# Patient Record
Sex: FEMALE | Race: WHITE | NOT HISPANIC OR LATINO | Employment: FULL TIME | URBAN - METROPOLITAN AREA
[De-identification: names, ages, dates, MRNs, and addresses within clinical notes are randomized per-mention and may not be internally consistent; named-entity substitution may affect disease eponyms.]

---

## 2017-03-15 ENCOUNTER — HOSPITAL ENCOUNTER (OUTPATIENT)
Dept: RADIOLOGY | Facility: MEDICAL CENTER | Age: 52
End: 2017-03-15

## 2017-03-23 ENCOUNTER — HOSPITAL ENCOUNTER (OUTPATIENT)
Dept: RADIOLOGY | Facility: MEDICAL CENTER | Age: 52
End: 2017-03-23
Attending: OBSTETRICS & GYNECOLOGY
Payer: COMMERCIAL

## 2017-03-23 DIAGNOSIS — Z12.31 SCREENING MAMMOGRAM, ENCOUNTER FOR: ICD-10-CM

## 2017-03-23 PROCEDURE — 77063 BREAST TOMOSYNTHESIS BI: CPT

## 2017-07-15 ENCOUNTER — HOSPITAL ENCOUNTER (OUTPATIENT)
Dept: LAB | Facility: MEDICAL CENTER | Age: 52
End: 2017-07-15
Attending: FAMILY MEDICINE
Payer: COMMERCIAL

## 2017-07-15 LAB
25(OH)D3 SERPL-MCNC: 21 NG/ML (ref 30–100)
ALBUMIN SERPL BCP-MCNC: 4.4 G/DL (ref 3.2–4.9)
ALBUMIN/GLOB SERPL: 2.1 G/DL
ALP SERPL-CCNC: 57 U/L (ref 30–99)
ALT SERPL-CCNC: 22 U/L (ref 2–50)
ANION GAP SERPL CALC-SCNC: 5 MMOL/L (ref 0–11.9)
AST SERPL-CCNC: 18 U/L (ref 12–45)
BASOPHILS # BLD AUTO: 0.8 % (ref 0–1.8)
BASOPHILS # BLD: 0.04 K/UL (ref 0–0.12)
BILIRUB SERPL-MCNC: 0.6 MG/DL (ref 0.1–1.5)
BUN SERPL-MCNC: 13 MG/DL (ref 8–22)
CALCIUM SERPL-MCNC: 9.8 MG/DL (ref 8.5–10.5)
CHLORIDE SERPL-SCNC: 106 MMOL/L (ref 96–112)
CHOLEST SERPL-MCNC: 194 MG/DL (ref 100–199)
CO2 SERPL-SCNC: 29 MMOL/L (ref 20–33)
CREAT SERPL-MCNC: 0.67 MG/DL (ref 0.5–1.4)
EOSINOPHIL # BLD AUTO: 0.33 K/UL (ref 0–0.51)
EOSINOPHIL NFR BLD: 6.3 % (ref 0–6.9)
ERYTHROCYTE [DISTWIDTH] IN BLOOD BY AUTOMATED COUNT: 40.6 FL (ref 35.9–50)
GFR SERPL CREATININE-BSD FRML MDRD: >60 ML/MIN/1.73 M 2
GLOBULIN SER CALC-MCNC: 2.1 G/DL (ref 1.9–3.5)
GLUCOSE SERPL-MCNC: 97 MG/DL (ref 65–99)
HCT VFR BLD AUTO: 45.9 % (ref 37–47)
HDLC SERPL-MCNC: 64 MG/DL
HGB BLD-MCNC: 15.2 G/DL (ref 12–16)
IMM GRANULOCYTES # BLD AUTO: 0.01 K/UL (ref 0–0.11)
IMM GRANULOCYTES NFR BLD AUTO: 0.2 % (ref 0–0.9)
LDLC SERPL CALC-MCNC: 110 MG/DL
LYMPHOCYTES # BLD AUTO: 1.86 K/UL (ref 1–4.8)
LYMPHOCYTES NFR BLD: 35.2 % (ref 22–41)
MCH RBC QN AUTO: 29.5 PG (ref 27–33)
MCHC RBC AUTO-ENTMCNC: 33.1 G/DL (ref 33.6–35)
MCV RBC AUTO: 89 FL (ref 81.4–97.8)
MONOCYTES # BLD AUTO: 0.31 K/UL (ref 0–0.85)
MONOCYTES NFR BLD AUTO: 5.9 % (ref 0–13.4)
NEUTROPHILS # BLD AUTO: 2.73 K/UL (ref 2–7.15)
NEUTROPHILS NFR BLD: 51.6 % (ref 44–72)
NRBC # BLD AUTO: 0 K/UL
NRBC BLD AUTO-RTO: 0 /100 WBC
PLATELET # BLD AUTO: 227 K/UL (ref 164–446)
PMV BLD AUTO: 11.9 FL (ref 9–12.9)
POTASSIUM SERPL-SCNC: 4.1 MMOL/L (ref 3.6–5.5)
PROT SERPL-MCNC: 6.5 G/DL (ref 6–8.2)
RBC # BLD AUTO: 5.16 M/UL (ref 4.2–5.4)
SODIUM SERPL-SCNC: 140 MMOL/L (ref 135–145)
T3 SERPL-MCNC: 86.2 NG/DL (ref 60–181)
T4 FREE SERPL-MCNC: 0.82 NG/DL (ref 0.53–1.43)
TRIGL SERPL-MCNC: 102 MG/DL (ref 0–149)
TSH SERPL DL<=0.005 MIU/L-ACNC: 5.2 UIU/ML (ref 0.3–3.7)
WBC # BLD AUTO: 5.3 K/UL (ref 4.8–10.8)

## 2017-07-15 PROCEDURE — 84480 ASSAY TRIIODOTHYRONINE (T3): CPT

## 2017-07-15 PROCEDURE — 84439 ASSAY OF FREE THYROXINE: CPT

## 2017-07-15 PROCEDURE — 80053 COMPREHEN METABOLIC PANEL: CPT

## 2017-07-15 PROCEDURE — 85025 COMPLETE CBC W/AUTO DIFF WBC: CPT

## 2017-07-15 PROCEDURE — 80061 LIPID PANEL: CPT

## 2017-07-15 PROCEDURE — 36415 COLL VENOUS BLD VENIPUNCTURE: CPT

## 2017-07-15 PROCEDURE — 84443 ASSAY THYROID STIM HORMONE: CPT

## 2017-07-15 PROCEDURE — 82306 VITAMIN D 25 HYDROXY: CPT

## 2017-12-14 ENCOUNTER — HOSPITAL ENCOUNTER (OUTPATIENT)
Dept: LAB | Facility: MEDICAL CENTER | Age: 52
End: 2017-12-14
Attending: NURSE PRACTITIONER
Payer: COMMERCIAL

## 2017-12-14 LAB
25(OH)D3 SERPL-MCNC: 27 NG/ML (ref 30–100)
T4 FREE SERPL-MCNC: 1.25 NG/DL (ref 0.53–1.43)
TSH SERPL DL<=0.005 MIU/L-ACNC: 0.09 UIU/ML (ref 0.38–5.33)

## 2017-12-14 PROCEDURE — 84439 ASSAY OF FREE THYROXINE: CPT

## 2017-12-14 PROCEDURE — 36415 COLL VENOUS BLD VENIPUNCTURE: CPT

## 2017-12-14 PROCEDURE — 82306 VITAMIN D 25 HYDROXY: CPT

## 2017-12-14 PROCEDURE — 84443 ASSAY THYROID STIM HORMONE: CPT

## 2018-03-15 ENCOUNTER — OFFICE VISIT (OUTPATIENT)
Dept: URGENT CARE | Facility: CLINIC | Age: 53
End: 2018-03-15
Payer: COMMERCIAL

## 2018-03-15 ENCOUNTER — APPOINTMENT (OUTPATIENT)
Dept: RADIOLOGY | Facility: IMAGING CENTER | Age: 53
End: 2018-03-15
Attending: EMERGENCY MEDICINE
Payer: COMMERCIAL

## 2018-03-15 VITALS
HEIGHT: 66 IN | TEMPERATURE: 97.5 F | OXYGEN SATURATION: 98 % | BODY MASS INDEX: 26.03 KG/M2 | DIASTOLIC BLOOD PRESSURE: 80 MMHG | SYSTOLIC BLOOD PRESSURE: 120 MMHG | RESPIRATION RATE: 18 BRPM | HEART RATE: 82 BPM | WEIGHT: 162 LBS

## 2018-03-15 DIAGNOSIS — S19.9XXA NECK INJURY, INITIAL ENCOUNTER: ICD-10-CM

## 2018-03-15 DIAGNOSIS — S39.012A STRAIN, BACK, INITIAL ENCOUNTER: ICD-10-CM

## 2018-03-15 DIAGNOSIS — S13.4XXA WHIPLASH INJURY TO NECK, INITIAL ENCOUNTER: ICD-10-CM

## 2018-03-15 DIAGNOSIS — S86.912A KNEE STRAIN, LEFT, INITIAL ENCOUNTER: ICD-10-CM
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
## 2018-03-15 PROCEDURE — 72040 X-RAY EXAM NECK SPINE 2-3 VW: CPT | Mod: TC,FY | Performed by: EMERGENCY MEDICINE

## 2018-03-15 PROCEDURE — 99203 OFFICE O/P NEW LOW 30 MIN: CPT | Performed by: EMERGENCY MEDICINE

## 2018-03-15 RX ORDER — LEVOTHYROXINE SODIUM 0.12 MG/1
112 TABLET ORAL
COMMUNITY
End: 2021-09-21

## 2018-03-15 RX ORDER — CYCLOBENZAPRINE HCL 5 MG
5-10 TABLET ORAL 3 TIMES DAILY PRN
Qty: 20 TAB | Refills: 0 | Status: SHIPPED | OUTPATIENT
Start: 2018-03-15 | End: 2021-09-21

## 2018-03-15 RX ORDER — CELECOXIB 200 MG/1
200 CAPSULE ORAL 2 TIMES DAILY PRN
Qty: 14 CAP | Refills: 0 | Status: CANCELLED | OUTPATIENT
Start: 2018-03-15

## 2018-03-15 RX ORDER — CELECOXIB 200 MG/1
200 CAPSULE ORAL 2 TIMES DAILY PRN
Qty: 14 CAP | Refills: 0 | Status: SHIPPED | OUTPATIENT
Start: 2018-03-15 | End: 2021-09-21

## 2018-03-15 RX ORDER — CYCLOBENZAPRINE HCL 5 MG
5-10 TABLET ORAL 3 TIMES DAILY PRN
Qty: 20 TAB | Refills: 0 | Status: CANCELLED | OUTPATIENT
Start: 2018-03-15

## 2018-03-15 ASSESSMENT — ENCOUNTER SYMPTOMS
VOMITING: 0
ABDOMINAL PAIN: 0
NAUSEA: 0
HEADACHES: 1
FEVER: 0
NECK PAIN: 1
LOSS OF CONSCIOUSNESS: 0
JOINT SWELLING: 0
BACK PAIN: 1
DIZZINESS: 0
FOCAL WEAKNESS: 0
SENSORY CHANGE: 0
TINGLING: 0
BLURRED VISION: 0

## 2018-03-15 ASSESSMENT — PATIENT HEALTH QUESTIONNAIRE - PHQ9: CLINICAL INTERPRETATION OF PHQ2 SCORE: 0
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
## 2018-03-16 ENCOUNTER — TELEPHONE (OUTPATIENT)
Dept: URGENT CARE | Facility: PHYSICIAN GROUP | Age: 53
End: 2018-03-16

## 2018-03-16 ENCOUNTER — HOSPITAL ENCOUNTER (OUTPATIENT)
Dept: RADIOLOGY | Facility: MEDICAL CENTER | Age: 53
End: 2018-03-16
Attending: EMERGENCY MEDICINE
Payer: COMMERCIAL

## 2018-03-16 DIAGNOSIS — S13.4XXA WHIPLASH INJURY TO NECK, INITIAL ENCOUNTER: ICD-10-CM

## 2018-03-16 PROCEDURE — 72141 MRI NECK SPINE W/O DYE: CPT

## 2018-03-16 NOTE — TELEPHONE ENCOUNTER
Please notify patient of MRI test of neck negative.  Per radiologist:  1.  Mild degenerative disease in the cervical spine as described above.  2.  There is no acute posttraumatic sequelae.  3.  There is no evidence of ligamentous injury.  No change in treatment plan.

## 2018-03-17 ENCOUNTER — TELEPHONE (OUTPATIENT)
Dept: URGENT CARE | Facility: PHYSICIAN GROUP | Age: 53
End: 2018-03-17

## 2018-03-17 DIAGNOSIS — S13.4XXA WHIPLASH INJURY TO NECK, INITIAL ENCOUNTER: ICD-10-CM

## 2018-03-17 RX ORDER — METHOCARBAMOL 500 MG/1
1000 TABLET, FILM COATED ORAL 4 TIMES DAILY PRN
Qty: 30 TAB | Refills: 0 | Status: SHIPPED | OUTPATIENT
Start: 2018-03-17 | End: 2021-09-21

## 2018-03-17 NOTE — TELEPHONE ENCOUNTER
Called pt regarding Rx , She have one more question regarding her last visit . She need to know why you did'n order a X-ray for her lower back . ????? Please could you call her back . thanks

## 2018-03-17 NOTE — TELEPHONE ENCOUNTER
Please notify patient of Rx for new muscle relaxant sent to pharmacy; discontinue cyclobenzaprine/Flexeril.

## 2018-03-19 ENCOUNTER — TELEPHONE (OUTPATIENT)
Dept: URGENT CARE | Facility: CLINIC | Age: 53
End: 2018-03-19

## 2018-03-19 NOTE — TELEPHONE ENCOUNTER
patient called asking for you to release hre results of her mri and xray into her mychart.    Thank you.
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
## 2018-04-09 ENCOUNTER — HOSPITAL ENCOUNTER (OUTPATIENT)
Dept: RADIOLOGY | Facility: MEDICAL CENTER | Age: 53
End: 2018-04-09
Attending: OBSTETRICS & GYNECOLOGY
Payer: COMMERCIAL

## 2018-04-09 DIAGNOSIS — Z12.31 SCREENING MAMMOGRAM, ENCOUNTER FOR: ICD-10-CM

## 2018-04-09 PROCEDURE — 77067 SCR MAMMO BI INCL CAD: CPT

## 2019-02-06 ENCOUNTER — HOSPITAL ENCOUNTER (OUTPATIENT)
Dept: LAB | Facility: MEDICAL CENTER | Age: 54
End: 2019-02-06
Attending: FAMILY MEDICINE
Payer: COMMERCIAL

## 2019-02-06 LAB
ALBUMIN SERPL BCP-MCNC: 4.6 G/DL (ref 3.2–4.9)
ALBUMIN/GLOB SERPL: 2.2 G/DL
ALP SERPL-CCNC: 55 U/L (ref 30–99)
ALT SERPL-CCNC: 30 U/L (ref 2–50)
ANION GAP SERPL CALC-SCNC: 10 MMOL/L (ref 0–11.9)
AST SERPL-CCNC: 24 U/L (ref 12–45)
BASOPHILS # BLD AUTO: 0.5 % (ref 0–1.8)
BASOPHILS # BLD: 0.03 K/UL (ref 0–0.12)
BILIRUB SERPL-MCNC: 0.4 MG/DL (ref 0.1–1.5)
BUN SERPL-MCNC: 11 MG/DL (ref 8–22)
CALCIUM SERPL-MCNC: 9.5 MG/DL (ref 8.5–10.5)
CHLORIDE SERPL-SCNC: 104 MMOL/L (ref 96–112)
CHOLEST SERPL-MCNC: 165 MG/DL (ref 100–199)
CO2 SERPL-SCNC: 25 MMOL/L (ref 20–33)
CREAT SERPL-MCNC: 0.67 MG/DL (ref 0.5–1.4)
EOSINOPHIL # BLD AUTO: 0.15 K/UL (ref 0–0.51)
EOSINOPHIL NFR BLD: 2.7 % (ref 0–6.9)
ERYTHROCYTE [DISTWIDTH] IN BLOOD BY AUTOMATED COUNT: 41.1 FL (ref 35.9–50)
FASTING STATUS PATIENT QL REPORTED: NORMAL
GLOBULIN SER CALC-MCNC: 2.1 G/DL (ref 1.9–3.5)
GLUCOSE SERPL-MCNC: 104 MG/DL (ref 65–99)
HCT VFR BLD AUTO: 46.6 % (ref 37–47)
HDLC SERPL-MCNC: 56 MG/DL
HGB BLD-MCNC: 15.4 G/DL (ref 12–16)
IMM GRANULOCYTES # BLD AUTO: 0.01 K/UL (ref 0–0.11)
IMM GRANULOCYTES NFR BLD AUTO: 0.2 % (ref 0–0.9)
LDLC SERPL CALC-MCNC: 95 MG/DL
LYMPHOCYTES # BLD AUTO: 1.7 K/UL (ref 1–4.8)
LYMPHOCYTES NFR BLD: 31 % (ref 22–41)
MCH RBC QN AUTO: 30 PG (ref 27–33)
MCHC RBC AUTO-ENTMCNC: 33 G/DL (ref 33.6–35)
MCV RBC AUTO: 90.8 FL (ref 81.4–97.8)
MONOCYTES # BLD AUTO: 0.28 K/UL (ref 0–0.85)
MONOCYTES NFR BLD AUTO: 5.1 % (ref 0–13.4)
NEUTROPHILS # BLD AUTO: 3.31 K/UL (ref 2–7.15)
NEUTROPHILS NFR BLD: 60.5 % (ref 44–72)
NRBC # BLD AUTO: 0 K/UL
NRBC BLD-RTO: 0 /100 WBC
PLATELET # BLD AUTO: 226 K/UL (ref 164–446)
PMV BLD AUTO: 12 FL (ref 9–12.9)
POTASSIUM SERPL-SCNC: 4.4 MMOL/L (ref 3.6–5.5)
PROT SERPL-MCNC: 6.7 G/DL (ref 6–8.2)
RBC # BLD AUTO: 5.13 M/UL (ref 4.2–5.4)
SODIUM SERPL-SCNC: 139 MMOL/L (ref 135–145)
TRIGL SERPL-MCNC: 70 MG/DL (ref 0–149)
WBC # BLD AUTO: 5.5 K/UL (ref 4.8–10.8)

## 2019-02-06 PROCEDURE — 83001 ASSAY OF GONADOTROPIN (FSH): CPT

## 2019-02-06 PROCEDURE — 82746 ASSAY OF FOLIC ACID SERUM: CPT

## 2019-02-06 PROCEDURE — 84439 ASSAY OF FREE THYROXINE: CPT

## 2019-02-06 PROCEDURE — 85025 COMPLETE CBC W/AUTO DIFF WBC: CPT

## 2019-02-06 PROCEDURE — 82306 VITAMIN D 25 HYDROXY: CPT

## 2019-02-06 PROCEDURE — 82671 ASSAY OF ESTROGENS: CPT

## 2019-02-06 PROCEDURE — 84443 ASSAY THYROID STIM HORMONE: CPT

## 2019-02-06 PROCEDURE — 82607 VITAMIN B-12: CPT

## 2019-02-06 PROCEDURE — 80061 LIPID PANEL: CPT

## 2019-02-06 PROCEDURE — 80053 COMPREHEN METABOLIC PANEL: CPT

## 2019-02-06 PROCEDURE — 36415 COLL VENOUS BLD VENIPUNCTURE: CPT

## 2019-02-07 LAB
25(OH)D3 SERPL-MCNC: 62 NG/ML (ref 30–100)
FOLATE SERPL-MCNC: 21.3 NG/ML
FSH SERPL-ACNC: 10.3 MIU/ML
T4 FREE SERPL-MCNC: 1.1 NG/DL (ref 0.53–1.43)
TSH SERPL DL<=0.005 MIU/L-ACNC: 0.63 UIU/ML (ref 0.38–5.33)
VIT B12 SERPL-MCNC: >1500 PG/ML (ref 211–911)

## 2019-02-11 LAB
ESTRADIOL SERPL HS-MCNC: 174 PG/ML
ESTROGEN SERPL CALC-MCNC: 289 PG/ML
ESTRONE SERPL-MCNC: 115 PG/ML

## 2019-04-10 ENCOUNTER — HOSPITAL ENCOUNTER (OUTPATIENT)
Dept: RADIOLOGY | Facility: MEDICAL CENTER | Age: 54
End: 2019-04-10
Attending: OBSTETRICS & GYNECOLOGY
Payer: COMMERCIAL

## 2019-04-10 DIAGNOSIS — Z12.31 SCREENING MAMMOGRAM, ENCOUNTER FOR: ICD-10-CM

## 2019-04-10 PROCEDURE — 77063 BREAST TOMOSYNTHESIS BI: CPT

## 2020-04-07 ENCOUNTER — HOSPITAL ENCOUNTER (OUTPATIENT)
Dept: LAB | Facility: MEDICAL CENTER | Age: 55
End: 2020-04-07
Attending: FAMILY MEDICINE
Payer: COMMERCIAL

## 2020-04-07 LAB
25(OH)D3 SERPL-MCNC: 64 NG/ML (ref 30–100)
ALBUMIN SERPL BCP-MCNC: 4.6 G/DL (ref 3.2–4.9)
ALBUMIN/GLOB SERPL: 2.1 G/DL
ALP SERPL-CCNC: 71 U/L (ref 30–99)
ALT SERPL-CCNC: 50 U/L (ref 2–50)
ANION GAP SERPL CALC-SCNC: 13 MMOL/L (ref 7–16)
APPEARANCE UR: CLEAR
AST SERPL-CCNC: 30 U/L (ref 12–45)
BASOPHILS # BLD AUTO: 1.2 % (ref 0–1.8)
BASOPHILS # BLD: 0.06 K/UL (ref 0–0.12)
BILIRUB SERPL-MCNC: 0.5 MG/DL (ref 0.1–1.5)
BILIRUB UR QL STRIP.AUTO: NEGATIVE
BUN SERPL-MCNC: 15 MG/DL (ref 8–22)
CALCIUM SERPL-MCNC: 9.4 MG/DL (ref 8.5–10.5)
CHLORIDE SERPL-SCNC: 103 MMOL/L (ref 96–112)
CHOLEST SERPL-MCNC: 188 MG/DL (ref 100–199)
CO2 SERPL-SCNC: 24 MMOL/L (ref 20–33)
COLOR UR: YELLOW
CREAT SERPL-MCNC: 0.6 MG/DL (ref 0.5–1.4)
EOSINOPHIL # BLD AUTO: 0.32 K/UL (ref 0–0.51)
EOSINOPHIL NFR BLD: 6.1 % (ref 0–6.9)
ERYTHROCYTE [DISTWIDTH] IN BLOOD BY AUTOMATED COUNT: 41 FL (ref 35.9–50)
FASTING STATUS PATIENT QL REPORTED: NORMAL
GLOBULIN SER CALC-MCNC: 2.2 G/DL (ref 1.9–3.5)
GLUCOSE SERPL-MCNC: 96 MG/DL (ref 65–99)
GLUCOSE UR STRIP.AUTO-MCNC: NEGATIVE MG/DL
HCT VFR BLD AUTO: 47 % (ref 37–47)
HDLC SERPL-MCNC: 54 MG/DL
HGB BLD-MCNC: 15.8 G/DL (ref 12–16)
IMM GRANULOCYTES # BLD AUTO: 0.01 K/UL (ref 0–0.11)
IMM GRANULOCYTES NFR BLD AUTO: 0.2 % (ref 0–0.9)
KETONES UR STRIP.AUTO-MCNC: NEGATIVE MG/DL
LDLC SERPL CALC-MCNC: 98 MG/DL
LEUKOCYTE ESTERASE UR QL STRIP.AUTO: NEGATIVE
LYMPHOCYTES # BLD AUTO: 2.16 K/UL (ref 1–4.8)
LYMPHOCYTES NFR BLD: 41.5 % (ref 22–41)
MCH RBC QN AUTO: 30 PG (ref 27–33)
MCHC RBC AUTO-ENTMCNC: 33.6 G/DL (ref 33.6–35)
MCV RBC AUTO: 89.4 FL (ref 81.4–97.8)
MICRO URNS: NORMAL
MONOCYTES # BLD AUTO: 0.35 K/UL (ref 0–0.85)
MONOCYTES NFR BLD AUTO: 6.7 % (ref 0–13.4)
NEUTROPHILS # BLD AUTO: 2.31 K/UL (ref 2–7.15)
NEUTROPHILS NFR BLD: 44.3 % (ref 44–72)
NITRITE UR QL STRIP.AUTO: NEGATIVE
NRBC # BLD AUTO: 0 K/UL
NRBC BLD-RTO: 0 /100 WBC
PH UR STRIP.AUTO: 6.5 [PH] (ref 5–8)
PLATELET # BLD AUTO: 217 K/UL (ref 164–446)
PMV BLD AUTO: 12 FL (ref 9–12.9)
POTASSIUM SERPL-SCNC: 4 MMOL/L (ref 3.6–5.5)
PROT SERPL-MCNC: 6.8 G/DL (ref 6–8.2)
PROT UR QL STRIP: NEGATIVE MG/DL
RBC # BLD AUTO: 5.26 M/UL (ref 4.2–5.4)
RBC UR QL AUTO: NEGATIVE
SODIUM SERPL-SCNC: 140 MMOL/L (ref 135–145)
SP GR UR STRIP.AUTO: 1.01
TRIGL SERPL-MCNC: 178 MG/DL (ref 0–149)
TSH SERPL DL<=0.005 MIU/L-ACNC: 4.37 UIU/ML (ref 0.38–5.33)
UROBILINOGEN UR STRIP.AUTO-MCNC: 0.2 MG/DL
WBC # BLD AUTO: 5.2 K/UL (ref 4.8–10.8)

## 2020-04-07 PROCEDURE — 36415 COLL VENOUS BLD VENIPUNCTURE: CPT

## 2020-04-07 PROCEDURE — 81003 URINALYSIS AUTO W/O SCOPE: CPT

## 2020-04-07 PROCEDURE — 84443 ASSAY THYROID STIM HORMONE: CPT

## 2020-04-07 PROCEDURE — 85025 COMPLETE CBC W/AUTO DIFF WBC: CPT

## 2020-04-07 PROCEDURE — 80061 LIPID PANEL: CPT

## 2020-04-07 PROCEDURE — 80053 COMPREHEN METABOLIC PANEL: CPT

## 2020-04-07 PROCEDURE — 82306 VITAMIN D 25 HYDROXY: CPT

## 2020-06-20 ENCOUNTER — HOSPITAL ENCOUNTER (OUTPATIENT)
Dept: RADIOLOGY | Facility: MEDICAL CENTER | Age: 55
End: 2020-06-20
Attending: OBSTETRICS & GYNECOLOGY
Payer: COMMERCIAL

## 2020-06-20 DIAGNOSIS — Z12.31 VISIT FOR SCREENING MAMMOGRAM: ICD-10-CM

## 2020-06-20 PROCEDURE — 77067 SCR MAMMO BI INCL CAD: CPT

## 2020-07-30 ENCOUNTER — HOSPITAL ENCOUNTER (OUTPATIENT)
Dept: LAB | Facility: MEDICAL CENTER | Age: 55
End: 2020-07-30
Attending: FAMILY MEDICINE
Payer: COMMERCIAL

## 2020-07-30 LAB
T4 FREE SERPL-MCNC: 1.4 NG/DL (ref 0.93–1.7)
TSH SERPL DL<=0.005 MIU/L-ACNC: 0.04 UIU/ML (ref 0.38–5.33)

## 2020-07-30 PROCEDURE — 84439 ASSAY OF FREE THYROXINE: CPT

## 2020-07-30 PROCEDURE — 36415 COLL VENOUS BLD VENIPUNCTURE: CPT

## 2020-07-30 PROCEDURE — 84443 ASSAY THYROID STIM HORMONE: CPT

## 2020-11-20 ENCOUNTER — HOSPITAL ENCOUNTER (OUTPATIENT)
Dept: LAB | Facility: MEDICAL CENTER | Age: 55
End: 2020-11-20
Attending: FAMILY MEDICINE
Payer: COMMERCIAL

## 2020-11-20 LAB
T4 FREE SERPL-MCNC: 1.77 NG/DL (ref 0.93–1.7)
TSH SERPL DL<=0.005 MIU/L-ACNC: 0.15 UIU/ML (ref 0.38–5.33)

## 2020-11-20 PROCEDURE — 84439 ASSAY OF FREE THYROXINE: CPT

## 2020-11-20 PROCEDURE — 36415 COLL VENOUS BLD VENIPUNCTURE: CPT

## 2020-11-20 PROCEDURE — 84443 ASSAY THYROID STIM HORMONE: CPT

## 2021-03-12 ENCOUNTER — HOSPITAL ENCOUNTER (OUTPATIENT)
Dept: LAB | Facility: MEDICAL CENTER | Age: 56
End: 2021-03-12
Attending: FAMILY MEDICINE
Payer: COMMERCIAL

## 2021-03-12 LAB
ALBUMIN SERPL BCP-MCNC: 4.3 G/DL (ref 3.2–4.9)
ALBUMIN/GLOB SERPL: 1.7 G/DL
ALP SERPL-CCNC: 65 U/L (ref 30–99)
ALT SERPL-CCNC: 36 U/L (ref 2–50)
ANION GAP SERPL CALC-SCNC: 11 MMOL/L (ref 7–16)
AST SERPL-CCNC: 26 U/L (ref 12–45)
BILIRUB SERPL-MCNC: 0.4 MG/DL (ref 0.1–1.5)
BUN SERPL-MCNC: 18 MG/DL (ref 8–22)
CA-I SERPL-SCNC: 1.2 MMOL/L (ref 1.1–1.3)
CALCIUM SERPL-MCNC: 10.1 MG/DL (ref 8.5–10.5)
CHLORIDE SERPL-SCNC: 103 MMOL/L (ref 96–112)
CO2 SERPL-SCNC: 25 MMOL/L (ref 20–33)
CREAT SERPL-MCNC: 0.82 MG/DL (ref 0.5–1.4)
GLOBULIN SER CALC-MCNC: 2.5 G/DL (ref 1.9–3.5)
GLUCOSE SERPL-MCNC: 86 MG/DL (ref 65–99)
MAGNESIUM SERPL-MCNC: 2.5 MG/DL (ref 1.5–2.5)
POTASSIUM SERPL-SCNC: 4.6 MMOL/L (ref 3.6–5.5)
PROT SERPL-MCNC: 6.8 G/DL (ref 6–8.2)
SODIUM SERPL-SCNC: 139 MMOL/L (ref 135–145)

## 2021-03-12 PROCEDURE — 82330 ASSAY OF CALCIUM: CPT

## 2021-03-12 PROCEDURE — 82607 VITAMIN B-12: CPT

## 2021-03-12 PROCEDURE — 82746 ASSAY OF FOLIC ACID SERUM: CPT

## 2021-03-12 PROCEDURE — 84443 ASSAY THYROID STIM HORMONE: CPT

## 2021-03-12 PROCEDURE — 80053 COMPREHEN METABOLIC PANEL: CPT

## 2021-03-12 PROCEDURE — 84439 ASSAY OF FREE THYROXINE: CPT

## 2021-03-12 PROCEDURE — 83735 ASSAY OF MAGNESIUM: CPT

## 2021-03-12 PROCEDURE — 36415 COLL VENOUS BLD VENIPUNCTURE: CPT

## 2021-03-12 PROCEDURE — 82306 VITAMIN D 25 HYDROXY: CPT

## 2021-03-13 LAB
25(OH)D3 SERPL-MCNC: 85 NG/ML (ref 30–100)
FOLATE SERPL-MCNC: 14.3 NG/ML
T4 FREE SERPL-MCNC: 1.44 NG/DL (ref 0.93–1.7)
TSH SERPL DL<=0.005 MIU/L-ACNC: 2.33 UIU/ML (ref 0.38–5.33)
VIT B12 SERPL-MCNC: >2000 PG/ML (ref 211–911)

## 2021-04-07 ENCOUNTER — IMMUNIZATION (OUTPATIENT)
Dept: FAMILY PLANNING/WOMEN'S HEALTH CLINIC | Facility: IMMUNIZATION CENTER | Age: 56
End: 2021-04-07
Payer: COMMERCIAL

## 2021-04-07 DIAGNOSIS — Z23 ENCOUNTER FOR VACCINATION: Primary | ICD-10-CM

## 2021-04-07 PROCEDURE — 0001A PFIZER SARS-COV-2 VACCINE: CPT

## 2021-04-07 PROCEDURE — 91300 PFIZER SARS-COV-2 VACCINE: CPT

## 2021-04-29 ENCOUNTER — IMMUNIZATION (OUTPATIENT)
Dept: FAMILY PLANNING/WOMEN'S HEALTH CLINIC | Facility: IMMUNIZATION CENTER | Age: 56
End: 2021-04-29
Payer: COMMERCIAL

## 2021-04-29 DIAGNOSIS — Z23 ENCOUNTER FOR VACCINATION: Primary | ICD-10-CM

## 2021-04-29 PROCEDURE — 0002A PFIZER SARS-COV-2 VACCINE: CPT

## 2021-04-29 PROCEDURE — 91300 PFIZER SARS-COV-2 VACCINE: CPT

## 2021-06-04 ENCOUNTER — HOSPITAL ENCOUNTER (OUTPATIENT)
Dept: LAB | Facility: MEDICAL CENTER | Age: 56
End: 2021-06-04
Attending: FAMILY MEDICINE
Payer: COMMERCIAL

## 2021-06-04 LAB
BASOPHILS # BLD AUTO: 0.8 % (ref 0–1.8)
BASOPHILS # BLD: 0.04 K/UL (ref 0–0.12)
CA-I SERPL-SCNC: 1.3 MMOL/L (ref 1.1–1.3)
CALCIUM SERPL-MCNC: 9.3 MG/DL (ref 8.5–10.5)
CHOLEST SERPL-MCNC: 177 MG/DL (ref 100–199)
EOSINOPHIL # BLD AUTO: 0.28 K/UL (ref 0–0.51)
EOSINOPHIL NFR BLD: 5.8 % (ref 0–6.9)
ERYTHROCYTE [DISTWIDTH] IN BLOOD BY AUTOMATED COUNT: 40.8 FL (ref 35.9–50)
FASTING STATUS PATIENT QL REPORTED: NORMAL
HCT VFR BLD AUTO: 44.2 % (ref 37–47)
HDLC SERPL-MCNC: 66 MG/DL
HGB BLD-MCNC: 14.8 G/DL (ref 12–16)
IMM GRANULOCYTES # BLD AUTO: 0.01 K/UL (ref 0–0.11)
IMM GRANULOCYTES NFR BLD AUTO: 0.2 % (ref 0–0.9)
LDLC SERPL CALC-MCNC: 93 MG/DL
LYMPHOCYTES # BLD AUTO: 1.95 K/UL (ref 1–4.8)
LYMPHOCYTES NFR BLD: 40.7 % (ref 22–41)
MCH RBC QN AUTO: 30.3 PG (ref 27–33)
MCHC RBC AUTO-ENTMCNC: 33.5 G/DL (ref 33.6–35)
MCV RBC AUTO: 90.6 FL (ref 81.4–97.8)
MONOCYTES # BLD AUTO: 0.24 K/UL (ref 0–0.85)
MONOCYTES NFR BLD AUTO: 5 % (ref 0–13.4)
NEUTROPHILS # BLD AUTO: 2.27 K/UL (ref 2–7.15)
NEUTROPHILS NFR BLD: 47.5 % (ref 44–72)
NRBC # BLD AUTO: 0 K/UL
NRBC BLD-RTO: 0 /100 WBC
PHOSPHATE SERPL-MCNC: 3 MG/DL (ref 2.5–4.5)
PLATELET # BLD AUTO: 216 K/UL (ref 164–446)
PMV BLD AUTO: 11.2 FL (ref 9–12.9)
PTH-INTACT SERPL-MCNC: 59.4 PG/ML (ref 14–72)
RBC # BLD AUTO: 4.88 M/UL (ref 4.2–5.4)
TRIGL SERPL-MCNC: 90 MG/DL (ref 0–149)
WBC # BLD AUTO: 4.8 K/UL (ref 4.8–10.8)

## 2021-06-04 PROCEDURE — 36415 COLL VENOUS BLD VENIPUNCTURE: CPT

## 2021-06-04 PROCEDURE — 82310 ASSAY OF CALCIUM: CPT

## 2021-06-04 PROCEDURE — 83970 ASSAY OF PARATHORMONE: CPT

## 2021-06-04 PROCEDURE — 82330 ASSAY OF CALCIUM: CPT

## 2021-06-04 PROCEDURE — 82340 ASSAY OF CALCIUM IN URINE: CPT

## 2021-06-04 PROCEDURE — 84100 ASSAY OF PHOSPHORUS: CPT

## 2021-06-04 PROCEDURE — 82542 COL CHROMOTOGRAPHY QUAL/QUAN: CPT

## 2021-06-04 PROCEDURE — 80061 LIPID PANEL: CPT

## 2021-06-04 PROCEDURE — 82652 VIT D 1 25-DIHYDROXY: CPT

## 2021-06-04 PROCEDURE — 82306 VITAMIN D 25 HYDROXY: CPT

## 2021-06-04 PROCEDURE — 85025 COMPLETE CBC W/AUTO DIFF WBC: CPT

## 2021-06-06 LAB
1,25(OH)2D3 SERPL-MCNC: 58.8 PG/ML (ref 19.9–79.3)
25(OH)D3 SERPL-MCNC: 79 NG/ML (ref 30–80)

## 2021-06-07 LAB
CALCIUM 24H UR-MCNC: 11.9 MG/DL
CALCIUM 24H UR-MRATE: NORMAL MG/D
CALCIUM/CREAT 24H UR: 149 MG/G (ref 20–300)
COLLECT DURATION TIME SPEC: NORMAL HRS
CREAT 24H UR-MCNC: 80 MG/DL
CREAT 24H UR-MRATE: NORMAL MG/D (ref 500–1400)
SPECIMEN VOL ?TM UR: NORMAL ML

## 2021-06-18 LAB — PTH RELATED PROT SERPL-SCNC: <2 PMOL/L (ref 0–3.4)

## 2021-07-19 ENCOUNTER — HOSPITAL ENCOUNTER (OUTPATIENT)
Dept: RADIOLOGY | Facility: MEDICAL CENTER | Age: 56
End: 2021-07-19
Attending: FAMILY MEDICINE
Payer: COMMERCIAL

## 2021-07-19 DIAGNOSIS — Z12.31 VISIT FOR SCREENING MAMMOGRAM: ICD-10-CM

## 2021-07-19 PROCEDURE — 77063 BREAST TOMOSYNTHESIS BI: CPT

## 2021-09-21 ENCOUNTER — OFFICE VISIT (OUTPATIENT)
Dept: ENDOCRINOLOGY | Facility: MEDICAL CENTER | Age: 56
End: 2021-09-21
Attending: NURSE PRACTITIONER
Payer: COMMERCIAL

## 2021-09-21 VITALS
HEART RATE: 96 BPM | OXYGEN SATURATION: 97 % | DIASTOLIC BLOOD PRESSURE: 72 MMHG | BODY MASS INDEX: 28.32 KG/M2 | SYSTOLIC BLOOD PRESSURE: 120 MMHG | WEIGHT: 170 LBS | HEIGHT: 65 IN

## 2021-09-21 DIAGNOSIS — E55.9 VITAMIN D DEFICIENCY: ICD-10-CM

## 2021-09-21 DIAGNOSIS — K11.5 SALIVARY STONE: ICD-10-CM

## 2021-09-21 DIAGNOSIS — E89.0 POST-SURGICAL HYPOTHYROIDISM: ICD-10-CM

## 2021-09-21 PROCEDURE — 99211 OFF/OP EST MAY X REQ PHY/QHP: CPT | Performed by: NURSE PRACTITIONER

## 2021-09-21 PROCEDURE — 99214 OFFICE O/P EST MOD 30 MIN: CPT | Performed by: NURSE PRACTITIONER

## 2021-09-21 RX ORDER — LEVOTHYROXINE SODIUM 112 UG/1
TABLET ORAL
COMMUNITY
Start: 2021-06-24 | End: 2021-09-21

## 2021-09-21 RX ORDER — LANOLIN ALCOHOL/MO/W.PET/CERES
CREAM (GRAM) TOPICAL
COMMUNITY
End: 2021-09-21

## 2021-09-21 RX ORDER — LEVOTHYROXINE SODIUM 112 UG/1
TABLET ORAL
COMMUNITY

## 2021-09-21 RX ORDER — ALPRAZOLAM 0.25 MG/1
0.25 TABLET ORAL
COMMUNITY

## 2021-09-21 RX ORDER — MEDROXYPROGESTERONE ACETATE 10 MG/1
TABLET ORAL
COMMUNITY
Start: 2021-07-29

## 2021-09-21 RX ORDER — ESTRADIOL 0.04 MG/D
PATCH, EXTENDED RELEASE TRANSDERMAL
COMMUNITY
Start: 2021-08-23 | End: 2021-09-21

## 2021-09-21 RX ORDER — ESTRADIOL 0.04 MG/D
FILM, EXTENDED RELEASE TRANSDERMAL
COMMUNITY

## 2021-09-21 ASSESSMENT — FIBROSIS 4 INDEX: FIB4 SCORE: 1.083333333333333333
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
## 2021-10-04 ENCOUNTER — TELEPHONE (OUTPATIENT)
Dept: ENDOCRINOLOGY | Facility: MEDICAL CENTER | Age: 56
End: 2021-10-04

## 2021-10-04 NOTE — TELEPHONE ENCOUNTER
1. Caller Name: Yvonne Whitaker                        Call Back Number: [phone] (home)      2. Message: Patient called stating that you wanted her to get a bone scan done but she had not heard from anyone. I have looked into patient's chart and I do not see a order that was ordered up for the patient.   Did you want this patient to get a bone scan done? And if so can you please place the order for patient.     Thank you.     3. Patient approves office to leave a detailed voicemail/MyChart message: yes

## 2021-10-13 PROBLEM — R40.0 SOMNOLENCE: Status: ACTIVE | Noted: 2021-10-13

## 2021-10-13 PROBLEM — G47.33 OBSTRUCTIVE SLEEP APNEA SYNDROME: Status: ACTIVE | Noted: 2021-10-13

## 2021-11-09 ENCOUNTER — TELEPHONE (OUTPATIENT)
Dept: ENDOCRINOLOGY | Facility: MEDICAL CENTER | Age: 56
End: 2021-11-09

## 2021-11-09 NOTE — TELEPHONE ENCOUNTER
Caller Name: Yvonne Whitaker  Call Back Number: [phone] (home)     How would the patient prefer to be contacted with a response: Phone      2. Message: Patient called stating that Esther  wanted her to get a bone scan done but she had not heard from anyone. I have looked into patient's chart and I do not see a order that was ordered up for the patient.   Did you want this patient to get a bone scan done? And if so can you please place the order for patient. Esther Blackwood is out of the office. Would you be able to help with this? Patient called today to follow up on this.      Thank you.      3. Patient approves office to leave a detailed voicemail/MyChart message: yes

## 2021-11-20 DIAGNOSIS — E04.9 ENLARGED THYROID: ICD-10-CM

## 2021-12-01 ENCOUNTER — APPOINTMENT (OUTPATIENT)
Dept: RADIOLOGY | Facility: MEDICAL CENTER | Age: 56
End: 2021-12-01
Attending: NURSE PRACTITIONER
Payer: COMMERCIAL

## 2021-12-02 ENCOUNTER — HOSPITAL ENCOUNTER (OUTPATIENT)
Dept: RADIOLOGY | Facility: MEDICAL CENTER | Age: 56
End: 2021-12-02
Attending: NURSE PRACTITIONER
Payer: COMMERCIAL

## 2021-12-02 DIAGNOSIS — E04.9 ENLARGED THYROID: ICD-10-CM

## 2021-12-02 PROCEDURE — 76536 US EXAM OF HEAD AND NECK: CPT

## 2022-02-25 ENCOUNTER — APPOINTMENT (OUTPATIENT)
Dept: ENDOCRINOLOGY | Facility: MEDICAL CENTER | Age: 57
End: 2022-02-25
Attending: NURSE PRACTITIONER
Payer: COMMERCIAL

## 2022-04-21 ENCOUNTER — HOSPITAL ENCOUNTER (OUTPATIENT)
Dept: LAB | Facility: MEDICAL CENTER | Age: 57
End: 2022-04-21
Attending: FAMILY MEDICINE
Payer: COMMERCIAL

## 2022-04-21 LAB
25(OH)D3 SERPL-MCNC: 81 NG/ML (ref 30–100)
ALBUMIN SERPL BCP-MCNC: 4.5 G/DL (ref 3.2–4.9)
ALBUMIN/GLOB SERPL: 2.5 G/DL
ALP SERPL-CCNC: 66 U/L (ref 30–99)
ALT SERPL-CCNC: 20 U/L (ref 2–50)
ANION GAP SERPL CALC-SCNC: 10 MMOL/L (ref 7–16)
AST SERPL-CCNC: 19 U/L (ref 12–45)
BILIRUB SERPL-MCNC: 0.4 MG/DL (ref 0.1–1.5)
BUN SERPL-MCNC: 12 MG/DL (ref 8–22)
CALCIUM SERPL-MCNC: 9.5 MG/DL (ref 8.5–10.5)
CHLORIDE SERPL-SCNC: 106 MMOL/L (ref 96–112)
CHOLEST SERPL-MCNC: 197 MG/DL (ref 100–199)
CO2 SERPL-SCNC: 26 MMOL/L (ref 20–33)
CREAT SERPL-MCNC: 0.57 MG/DL (ref 0.5–1.4)
GFR SERPLBLD CREATININE-BSD FMLA CKD-EPI: 107 ML/MIN/1.73 M 2
GLOBULIN SER CALC-MCNC: 1.8 G/DL (ref 1.9–3.5)
GLUCOSE SERPL-MCNC: 98 MG/DL (ref 65–99)
HDLC SERPL-MCNC: 64 MG/DL
LDLC SERPL CALC-MCNC: 109 MG/DL
POTASSIUM SERPL-SCNC: 4 MMOL/L (ref 3.6–5.5)
PROT SERPL-MCNC: 6.3 G/DL (ref 6–8.2)
SODIUM SERPL-SCNC: 142 MMOL/L (ref 135–145)
T4 FREE SERPL-MCNC: 1.31 NG/DL (ref 0.93–1.7)
TRIGL SERPL-MCNC: 120 MG/DL (ref 0–149)
TSH SERPL DL<=0.005 MIU/L-ACNC: 2.66 UIU/ML (ref 0.38–5.33)

## 2022-04-21 PROCEDURE — 84443 ASSAY THYROID STIM HORMONE: CPT

## 2022-04-21 PROCEDURE — 80053 COMPREHEN METABOLIC PANEL: CPT

## 2022-04-21 PROCEDURE — 36415 COLL VENOUS BLD VENIPUNCTURE: CPT

## 2022-04-21 PROCEDURE — 80061 LIPID PANEL: CPT

## 2022-04-21 PROCEDURE — 82306 VITAMIN D 25 HYDROXY: CPT

## 2022-04-21 PROCEDURE — 84439 ASSAY OF FREE THYROXINE: CPT

## 2022-05-13 ENCOUNTER — TELEMEDICINE (OUTPATIENT)
Dept: ENDOCRINOLOGY | Facility: MEDICAL CENTER | Age: 57
End: 2022-05-13
Attending: NURSE PRACTITIONER
Payer: COMMERCIAL

## 2022-05-13 DIAGNOSIS — E89.0 POST-SURGICAL HYPOTHYROIDISM: ICD-10-CM

## 2022-05-13 DIAGNOSIS — K11.5 SALIVARY STONE: ICD-10-CM

## 2022-05-13 DIAGNOSIS — E55.9 VITAMIN D DEFICIENCY: ICD-10-CM

## 2022-05-13 PROCEDURE — 99214 OFFICE O/P EST MOD 30 MIN: CPT | Mod: 95 | Performed by: NURSE PRACTITIONER

## 2022-05-13 RX ORDER — CHOLECALCIFEROL (VITAMIN D3) 1250 MCG
CAPSULE ORAL
COMMUNITY

## 2022-05-13 RX ORDER — LANOLIN ALCOHOL/MO/W.PET/CERES
CREAM (GRAM) TOPICAL
COMMUNITY

## 2022-05-13 RX ORDER — ESTRADIOL 0.04 MG/D
FILM, EXTENDED RELEASE TRANSDERMAL
COMMUNITY

## 2022-05-13 RX ORDER — TRIAMCINOLONE ACETONIDE 1 MG/G
CREAM TOPICAL
COMMUNITY
Start: 2022-04-11

## 2022-05-13 RX ORDER — LEVOTHYROXINE SODIUM 112 UG/1
TABLET ORAL
COMMUNITY
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
## 2022-07-21 ENCOUNTER — HOSPITAL ENCOUNTER (OUTPATIENT)
Dept: RADIOLOGY | Facility: MEDICAL CENTER | Age: 57
End: 2022-07-21
Attending: OBSTETRICS & GYNECOLOGY
Payer: COMMERCIAL

## 2022-07-21 DIAGNOSIS — Z12.31 ENCOUNTER FOR SCREENING MAMMOGRAM FOR MALIGNANT NEOPLASM OF BREAST: ICD-10-CM

## 2022-07-21 PROCEDURE — 77063 BREAST TOMOSYNTHESIS BI: CPT
